# Patient Record
Sex: FEMALE
[De-identification: names, ages, dates, MRNs, and addresses within clinical notes are randomized per-mention and may not be internally consistent; named-entity substitution may affect disease eponyms.]

---

## 2017-05-24 ENCOUNTER — HOSPITAL (OUTPATIENT)
Dept: OTHER | Age: 37
End: 2017-05-24
Attending: FAMILY MEDICINE

## 2017-06-20 ENCOUNTER — HOSPITAL (OUTPATIENT)
Dept: OTHER | Age: 37
End: 2017-06-20
Attending: FAMILY MEDICINE

## 2018-07-15 ENCOUNTER — LAB SERVICES (OUTPATIENT)
Dept: OTHER | Age: 38
End: 2018-07-15

## 2018-07-15 ENCOUNTER — CHARTING TRANS (OUTPATIENT)
Dept: OTHER | Age: 38
End: 2018-07-15

## 2018-07-15 LAB — RAPID STREP GROUP A: NORMAL

## 2018-10-31 VITALS
HEART RATE: 84 BPM | DIASTOLIC BLOOD PRESSURE: 80 MMHG | SYSTOLIC BLOOD PRESSURE: 104 MMHG | WEIGHT: 132.28 LBS | BODY MASS INDEX: 24.97 KG/M2 | RESPIRATION RATE: 16 BRPM | TEMPERATURE: 98.3 F | HEIGHT: 61 IN

## 2022-01-25 ENCOUNTER — LAB ENCOUNTER (OUTPATIENT)
Dept: LAB | Age: 42
End: 2022-01-25
Attending: PHYSICIAN ASSISTANT
Payer: COMMERCIAL

## 2022-01-25 DIAGNOSIS — Z12.31 ENCOUNTER FOR SCREENING MAMMOGRAM FOR BREAST CANCER: Primary | ICD-10-CM

## 2022-01-25 LAB
ALBUMIN SERPL-MCNC: 4 G/DL (ref 3.4–5)
ALBUMIN/GLOB SERPL: 1.3 {RATIO} (ref 1–2)
ALP LIVER SERPL-CCNC: 81 U/L
ALT SERPL-CCNC: 28 U/L
ANION GAP SERPL CALC-SCNC: 6 MMOL/L (ref 0–18)
AST SERPL-CCNC: 12 U/L (ref 15–37)
BASOPHILS # BLD AUTO: 0.06 X10(3) UL (ref 0–0.2)
BASOPHILS NFR BLD AUTO: 0.7 %
BILIRUB SERPL-MCNC: 0.3 MG/DL (ref 0.1–2)
BILIRUB UR QL STRIP.AUTO: NEGATIVE
BUN BLD-MCNC: 11 MG/DL (ref 7–18)
CALCIUM BLD-MCNC: 9.5 MG/DL (ref 8.5–10.1)
CHLORIDE SERPL-SCNC: 106 MMOL/L (ref 98–112)
CHOLEST SERPL-MCNC: 224 MG/DL (ref ?–200)
CLARITY UR REFRACT.AUTO: CLEAR
CO2 SERPL-SCNC: 27 MMOL/L (ref 21–32)
COLOR UR AUTO: YELLOW
CREAT BLD-MCNC: 0.79 MG/DL
EOSINOPHIL # BLD AUTO: 0.21 X10(3) UL (ref 0–0.7)
EOSINOPHIL NFR BLD AUTO: 2.6 %
ERYTHROCYTE [DISTWIDTH] IN BLOOD BY AUTOMATED COUNT: 15.8 %
FASTING PATIENT LIPID ANSWER: YES
FASTING STATUS PATIENT QL REPORTED: YES
GLOBULIN PLAS-MCNC: 3.2 G/DL (ref 2.8–4.4)
GLUCOSE BLD-MCNC: 82 MG/DL (ref 70–99)
GLUCOSE UR STRIP.AUTO-MCNC: NEGATIVE MG/DL
HCT VFR BLD AUTO: 38.9 %
HDLC SERPL-MCNC: 49 MG/DL (ref 40–59)
HGB BLD-MCNC: 11.3 G/DL
IMM GRANULOCYTES # BLD AUTO: 0.02 X10(3) UL (ref 0–1)
IMM GRANULOCYTES NFR BLD: 0.2 %
KETONES UR STRIP.AUTO-MCNC: NEGATIVE MG/DL
LDLC SERPL CALC-MCNC: 159 MG/DL (ref ?–100)
LEUKOCYTE ESTERASE UR QL STRIP.AUTO: NEGATIVE
LYMPHOCYTES # BLD AUTO: 3.5 X10(3) UL (ref 1–4)
LYMPHOCYTES NFR BLD AUTO: 43.1 %
MCH RBC QN AUTO: 24 PG (ref 26–34)
MCHC RBC AUTO-ENTMCNC: 29 G/DL (ref 31–37)
MCV RBC AUTO: 82.8 FL
MONOCYTES # BLD AUTO: 0.59 X10(3) UL (ref 0.1–1)
MONOCYTES NFR BLD AUTO: 7.3 %
NEUTROPHILS # BLD AUTO: 3.75 X10 (3) UL (ref 1.5–7.7)
NEUTROPHILS # BLD AUTO: 3.75 X10(3) UL (ref 1.5–7.7)
NEUTROPHILS NFR BLD AUTO: 46.1 %
NITRITE UR QL STRIP.AUTO: NEGATIVE
NONHDLC SERPL-MCNC: 175 MG/DL (ref ?–130)
OSMOLALITY SERPL CALC.SUM OF ELEC: 286 MOSM/KG (ref 275–295)
PH UR STRIP.AUTO: 5 [PH] (ref 5–8)
PLATELET # BLD AUTO: 374 10(3)UL (ref 150–450)
POTASSIUM SERPL-SCNC: 4 MMOL/L (ref 3.5–5.1)
PROT SERPL-MCNC: 7.2 G/DL (ref 6.4–8.2)
PROT UR STRIP.AUTO-MCNC: NEGATIVE MG/DL
RBC # BLD AUTO: 4.7 X10(6)UL
SODIUM SERPL-SCNC: 139 MMOL/L (ref 136–145)
SP GR UR STRIP.AUTO: 1.02 (ref 1–1.03)
TRIGL SERPL-MCNC: 88 MG/DL (ref 30–149)
TSI SER-ACNC: 2.01 MIU/ML (ref 0.36–3.74)
UROBILINOGEN UR STRIP.AUTO-MCNC: <2 MG/DL
VLDLC SERPL CALC-MCNC: 17 MG/DL (ref 0–30)
WBC # BLD AUTO: 8.1 X10(3) UL (ref 4–11)

## 2022-01-25 PROCEDURE — 84443 ASSAY THYROID STIM HORMONE: CPT

## 2022-01-25 PROCEDURE — 36415 COLL VENOUS BLD VENIPUNCTURE: CPT

## 2022-01-25 PROCEDURE — 81001 URINALYSIS AUTO W/SCOPE: CPT

## 2022-01-25 PROCEDURE — 80061 LIPID PANEL: CPT

## 2022-01-25 PROCEDURE — 80053 COMPREHEN METABOLIC PANEL: CPT

## 2022-01-25 PROCEDURE — 85025 COMPLETE CBC W/AUTO DIFF WBC: CPT

## 2022-02-17 ENCOUNTER — HOSPITAL ENCOUNTER (OUTPATIENT)
Dept: MAMMOGRAPHY | Age: 42
Discharge: HOME | End: 2022-02-17
Attending: PHYSICIAN ASSISTANT
Payer: COMMERCIAL

## 2022-02-17 DIAGNOSIS — Z12.31 ENCOUNTER FOR SCREENING MAMMOGRAM FOR MALIGNANT NEOPLASM OF BREAST: ICD-10-CM

## 2022-02-17 PROCEDURE — 77063 BREAST TOMOSYNTHESIS BI: CPT | Performed by: PHYSICIAN ASSISTANT

## 2022-02-17 PROCEDURE — 77067 SCR MAMMO BI INCL CAD: CPT | Performed by: PHYSICIAN ASSISTANT

## 2022-02-22 ENCOUNTER — HOSPITAL ENCOUNTER (OUTPATIENT)
Dept: ULTRASOUND IMAGING | Age: 42
Discharge: HOME | End: 2022-02-22
Attending: PHYSICIAN ASSISTANT
Payer: COMMERCIAL

## 2022-02-22 DIAGNOSIS — N92.1 METRORRHAGIA: ICD-10-CM

## 2022-02-22 PROCEDURE — 76830 TRANSVAGINAL US NON-OB: CPT | Performed by: PHYSICIAN ASSISTANT

## 2022-02-22 PROCEDURE — 76856 US EXAM PELVIC COMPLETE: CPT | Performed by: PHYSICIAN ASSISTANT

## 2022-03-16 PROBLEM — K21.9 GASTROESOPHAGEAL REFLUX DISEASE: Status: ACTIVE | Noted: 2022-03-16

## 2022-03-16 PROBLEM — K58.9 IRRITABLE BOWEL SYNDROME: Status: ACTIVE | Noted: 2022-03-16

## 2022-04-07 ENCOUNTER — HOSPITAL ENCOUNTER (OUTPATIENT)
Dept: CT IMAGING | Age: 42
Discharge: HOME | End: 2022-04-07
Attending: PHYSICIAN ASSISTANT
Payer: COMMERCIAL

## 2022-04-07 ENCOUNTER — HOSPITAL ENCOUNTER (OUTPATIENT)
Dept: ULTRASOUND IMAGING | Age: 42
Discharge: HOME | End: 2022-04-07
Attending: PHYSICIAN ASSISTANT
Payer: COMMERCIAL

## 2022-04-07 DIAGNOSIS — R10.30 LOWER ABDOMINAL PAIN: ICD-10-CM

## 2022-04-07 DIAGNOSIS — E04.9 GOITER: ICD-10-CM

## 2022-04-07 LAB — CREAT BLD-MCNC: 0.8 MG/DL

## 2022-04-07 PROCEDURE — 76536 US EXAM OF HEAD AND NECK: CPT | Performed by: PHYSICIAN ASSISTANT

## 2022-04-07 PROCEDURE — 82565 ASSAY OF CREATININE: CPT

## 2022-04-07 PROCEDURE — 74178 CT ABD&PLV WO CNTR FLWD CNTR: CPT | Performed by: PHYSICIAN ASSISTANT

## 2022-04-07 RX ORDER — IOHEXOL 350 MG/ML
80 INJECTION, SOLUTION INTRAVENOUS
Status: COMPLETED | OUTPATIENT
Start: 2022-04-07 | End: 2022-04-07

## 2022-04-07 RX ADMIN — IOHEXOL 80 ML: 350 INJECTION, SOLUTION INTRAVENOUS at 15:23:00

## 2022-04-14 ENCOUNTER — LAB ENCOUNTER (OUTPATIENT)
Dept: LAB | Age: 42
End: 2022-04-14
Attending: PHYSICIAN ASSISTANT
Payer: COMMERCIAL

## 2022-04-14 DIAGNOSIS — E04.9 GOITER: Primary | ICD-10-CM

## 2022-04-14 LAB
IGA SERPL-MCNC: 108 MG/DL (ref 70–312)
T3FREE SERPL-MCNC: 2.48 PG/ML (ref 2.4–4.2)
T4 FREE SERPL-MCNC: 1.1 NG/DL (ref 0.8–1.7)
TSI SER-ACNC: 2.65 MIU/ML (ref 0.36–3.74)

## 2022-04-14 PROCEDURE — 84443 ASSAY THYROID STIM HORMONE: CPT

## 2022-04-14 PROCEDURE — 86364 TISS TRNSGLTMNASE EA IG CLAS: CPT

## 2022-04-14 PROCEDURE — 36415 COLL VENOUS BLD VENIPUNCTURE: CPT

## 2022-04-14 PROCEDURE — 84481 FREE ASSAY (FT-3): CPT

## 2022-04-14 PROCEDURE — 84439 ASSAY OF FREE THYROXINE: CPT

## 2022-04-15 LAB — TTG IGA SER-ACNC: 0.3 U/ML (ref ?–7)

## 2022-05-08 ENCOUNTER — LAB ENCOUNTER (OUTPATIENT)
Dept: LAB | Facility: HOSPITAL | Age: 42
End: 2022-05-08
Payer: COMMERCIAL

## 2022-05-08 DIAGNOSIS — Z01.818 PRE-OP TESTING: ICD-10-CM

## 2022-05-09 LAB — SARS-COV-2 RNA RESP QL NAA+PROBE: NOT DETECTED

## 2022-05-11 PROBLEM — R10.84 ABDOMINAL PAIN, GENERALIZED: Status: ACTIVE | Noted: 2022-05-11

## 2022-05-11 PROBLEM — K92.1 HEMATOCHEZIA: Status: ACTIVE | Noted: 2022-05-11

## 2022-05-11 PROBLEM — R10.13 ABDOMINAL PAIN, EPIGASTRIC: Status: ACTIVE | Noted: 2022-05-11

## 2022-05-11 PROBLEM — R63.4 WEIGHT LOSS: Status: ACTIVE | Noted: 2022-05-11

## 2022-05-11 PROCEDURE — 88305 TISSUE EXAM BY PATHOLOGIST: CPT | Performed by: INTERNAL MEDICINE

## 2023-11-17 ENCOUNTER — HOSPITAL ENCOUNTER (EMERGENCY)
Facility: HOSPITAL | Age: 43
Discharge: HOME | End: 2023-11-17
Attending: EMERGENCY MEDICINE
Payer: COMMERCIAL

## 2023-11-17 ENCOUNTER — APPOINTMENT (OUTPATIENT)
Dept: MRI IMAGING | Facility: HOSPITAL | Age: 43
End: 2023-11-17
Attending: EMERGENCY MEDICINE
Payer: COMMERCIAL

## 2023-11-17 VITALS
BODY MASS INDEX: 25.49 KG/M2 | RESPIRATION RATE: 16 BRPM | HEIGHT: 61 IN | SYSTOLIC BLOOD PRESSURE: 125 MMHG | OXYGEN SATURATION: 97 % | TEMPERATURE: 97 F | DIASTOLIC BLOOD PRESSURE: 63 MMHG | WEIGHT: 135 LBS | HEART RATE: 83 BPM

## 2023-11-17 DIAGNOSIS — R42 VERTIGO: Primary | ICD-10-CM

## 2023-11-17 LAB
ALBUMIN SERPL-MCNC: 3.8 G/DL (ref 3.4–5)
ALBUMIN/GLOB SERPL: 1 {RATIO} (ref 1–2)
ALP LIVER SERPL-CCNC: 99 U/L
ALT SERPL-CCNC: 23 U/L
ANION GAP SERPL CALC-SCNC: 3 MMOL/L (ref 0–18)
AST SERPL-CCNC: 17 U/L (ref 15–37)
BASOPHILS # BLD AUTO: 0.07 X10(3) UL (ref 0–0.2)
BASOPHILS NFR BLD AUTO: 0.8 %
BILIRUB SERPL-MCNC: 0.3 MG/DL (ref 0.1–2)
BUN BLD-MCNC: 9 MG/DL (ref 9–23)
CALCIUM BLD-MCNC: 9.9 MG/DL (ref 8.5–10.1)
CHLORIDE SERPL-SCNC: 105 MMOL/L (ref 98–112)
CO2 SERPL-SCNC: 29 MMOL/L (ref 21–32)
CREAT BLD-MCNC: 0.9 MG/DL
EGFRCR SERPLBLD CKD-EPI 2021: 81 ML/MIN/1.73M2 (ref 60–?)
EOSINOPHIL # BLD AUTO: 0.24 X10(3) UL (ref 0–0.7)
EOSINOPHIL NFR BLD AUTO: 2.6 %
ERYTHROCYTE [DISTWIDTH] IN BLOOD BY AUTOMATED COUNT: 13.2 %
GLOBULIN PLAS-MCNC: 3.9 G/DL (ref 2.8–4.4)
GLUCOSE BLD-MCNC: 118 MG/DL (ref 70–99)
HCT VFR BLD AUTO: 40.5 %
HGB BLD-MCNC: 13.2 G/DL
IMM GRANULOCYTES # BLD AUTO: 0.03 X10(3) UL (ref 0–1)
IMM GRANULOCYTES NFR BLD: 0.3 %
LIPASE SERPL-CCNC: 46 U/L (ref 13–75)
LYMPHOCYTES # BLD AUTO: 2.97 X10(3) UL (ref 1–4)
LYMPHOCYTES NFR BLD AUTO: 32 %
MCH RBC QN AUTO: 27.3 PG (ref 26–34)
MCHC RBC AUTO-ENTMCNC: 32.6 G/DL (ref 31–37)
MCV RBC AUTO: 83.9 FL
MONOCYTES # BLD AUTO: 0.53 X10(3) UL (ref 0.1–1)
MONOCYTES NFR BLD AUTO: 5.7 %
NEUTROPHILS # BLD AUTO: 5.43 X10 (3) UL (ref 1.5–7.7)
NEUTROPHILS # BLD AUTO: 5.43 X10(3) UL (ref 1.5–7.7)
NEUTROPHILS NFR BLD AUTO: 58.6 %
OSMOLALITY SERPL CALC.SUM OF ELEC: 284 MOSM/KG (ref 275–295)
PLATELET # BLD AUTO: 348 10(3)UL (ref 150–450)
POTASSIUM SERPL-SCNC: 3.2 MMOL/L (ref 3.5–5.1)
PROT SERPL-MCNC: 7.7 G/DL (ref 6.4–8.2)
RBC # BLD AUTO: 4.83 X10(6)UL
SODIUM SERPL-SCNC: 137 MMOL/L (ref 136–145)
WBC # BLD AUTO: 9.3 X10(3) UL (ref 4–11)

## 2023-11-17 PROCEDURE — 99285 EMERGENCY DEPT VISIT HI MDM: CPT

## 2023-11-17 PROCEDURE — 93010 ELECTROCARDIOGRAM REPORT: CPT

## 2023-11-17 PROCEDURE — 83690 ASSAY OF LIPASE: CPT

## 2023-11-17 PROCEDURE — 85025 COMPLETE CBC W/AUTO DIFF WBC: CPT

## 2023-11-17 PROCEDURE — 70546 MR ANGIOGRAPH HEAD W/O&W/DYE: CPT | Performed by: EMERGENCY MEDICINE

## 2023-11-17 PROCEDURE — 70553 MRI BRAIN STEM W/O & W/DYE: CPT | Performed by: EMERGENCY MEDICINE

## 2023-11-17 PROCEDURE — 96375 TX/PRO/DX INJ NEW DRUG ADDON: CPT

## 2023-11-17 PROCEDURE — 80053 COMPREHEN METABOLIC PANEL: CPT | Performed by: EMERGENCY MEDICINE

## 2023-11-17 PROCEDURE — 96374 THER/PROPH/DIAG INJ IV PUSH: CPT

## 2023-11-17 PROCEDURE — 70549 MR ANGIOGRAPH NECK W/O&W/DYE: CPT | Performed by: EMERGENCY MEDICINE

## 2023-11-17 PROCEDURE — A9575 INJ GADOTERATE MEGLUMI 0.1ML: HCPCS | Performed by: EMERGENCY MEDICINE

## 2023-11-17 PROCEDURE — 93005 ELECTROCARDIOGRAM TRACING: CPT

## 2023-11-17 PROCEDURE — 99284 EMERGENCY DEPT VISIT MOD MDM: CPT

## 2023-11-17 PROCEDURE — 85025 COMPLETE CBC W/AUTO DIFF WBC: CPT | Performed by: EMERGENCY MEDICINE

## 2023-11-17 PROCEDURE — 80053 COMPREHEN METABOLIC PANEL: CPT

## 2023-11-17 PROCEDURE — 83690 ASSAY OF LIPASE: CPT | Performed by: EMERGENCY MEDICINE

## 2023-11-17 RX ORDER — DIAZEPAM 5 MG/1
5 TABLET ORAL 3 TIMES DAILY PRN
Qty: 15 TABLET | Refills: 0 | Status: SHIPPED | OUTPATIENT
Start: 2023-11-17 | End: 2023-11-22

## 2023-11-17 RX ORDER — GADOTERATE MEGLUMINE 376.9 MG/ML
15 INJECTION INTRAVENOUS
Status: COMPLETED | OUTPATIENT
Start: 2023-11-17 | End: 2023-11-17

## 2023-11-17 RX ORDER — LORAZEPAM 2 MG/ML
0.5 INJECTION INTRAMUSCULAR ONCE
Status: COMPLETED | OUTPATIENT
Start: 2023-11-17 | End: 2023-11-17

## 2023-11-17 RX ORDER — ONDANSETRON 4 MG/1
4 TABLET, ORALLY DISINTEGRATING ORAL EVERY 8 HOURS PRN
Qty: 20 TABLET | Refills: 0 | Status: SHIPPED | OUTPATIENT
Start: 2023-11-17 | End: 2023-11-24

## 2023-11-17 RX ORDER — POTASSIUM CHLORIDE 20 MEQ/1
40 TABLET, EXTENDED RELEASE ORAL ONCE
Status: COMPLETED | OUTPATIENT
Start: 2023-11-17 | End: 2023-11-17

## 2023-11-17 RX ORDER — SODIUM CHLORIDE 9 MG/ML
1000 INJECTION, SOLUTION INTRAVENOUS CONTINUOUS
Status: DISCONTINUED | OUTPATIENT
Start: 2023-11-17 | End: 2023-11-17

## 2023-11-17 RX ORDER — MECLIZINE HYDROCHLORIDE 25 MG/1
25 TABLET ORAL 3 TIMES DAILY PRN
Qty: 20 TABLET | Refills: 0 | Status: SHIPPED | OUTPATIENT
Start: 2023-11-17

## 2023-11-17 RX ORDER — ONDANSETRON 2 MG/ML
4 INJECTION INTRAMUSCULAR; INTRAVENOUS ONCE
Status: COMPLETED | OUTPATIENT
Start: 2023-11-17 | End: 2023-11-17

## 2023-11-17 RX ORDER — MECLIZINE HYDROCHLORIDE 25 MG/1
25 TABLET ORAL ONCE
Status: COMPLETED | OUTPATIENT
Start: 2023-11-17 | End: 2023-11-17

## 2023-11-17 NOTE — ED INITIAL ASSESSMENT (HPI)
A&Ox3 patient p/w dizziness and nausea xmonths    Patient reports having constant symptoms since having hysterectomy surgery at [facility] >6mo ago    Reports worsening dizziness today \"the room is spinning and I am spinning, and I feel like I will faint\"    Denies any cp/sob/v/d/c/f/LH/vision changes/urinary sx at this time    RR even/NL, speaking in full clear sentences

## 2023-11-18 LAB
ATRIAL RATE: 100 BPM
P AXIS: 57 DEGREES
P-R INTERVAL: 164 MS
Q-T INTERVAL: 352 MS
QRS DURATION: 82 MS
QTC CALCULATION (BEZET): 454 MS
R AXIS: -9 DEGREES
T AXIS: 53 DEGREES
VENTRICULAR RATE: 100 BPM

## 2023-11-18 NOTE — DISCHARGE INSTRUCTIONS
Return to emergency room if further problems    Take Antivert for mild vertigo    Valium for severe vertigo    Zofran for nausea/vomiting    Recommend Epley maneuvers 3 times daily at home

## 2025-06-27 ENCOUNTER — HOSPITAL ENCOUNTER (OUTPATIENT)
Facility: HOSPITAL | Age: 45
Setting detail: HOSPITAL OUTPATIENT SURGERY
Discharge: HOME | End: 2025-06-27
Attending: STUDENT IN AN ORGANIZED HEALTH CARE EDUCATION/TRAINING PROGRAM | Admitting: STUDENT IN AN ORGANIZED HEALTH CARE EDUCATION/TRAINING PROGRAM
Payer: COMMERCIAL

## 2025-06-27 ENCOUNTER — ANESTHESIA EVENT (OUTPATIENT)
Dept: SURGERY | Facility: HOSPITAL | Age: 45
End: 2025-06-27
Payer: COMMERCIAL

## 2025-06-27 ENCOUNTER — ANESTHESIA (OUTPATIENT)
Dept: SURGERY | Facility: HOSPITAL | Age: 45
End: 2025-06-27
Payer: COMMERCIAL

## 2025-06-27 VITALS
RESPIRATION RATE: 16 BRPM | DIASTOLIC BLOOD PRESSURE: 73 MMHG | WEIGHT: 140.81 LBS | TEMPERATURE: 97 F | HEART RATE: 91 BPM | BODY MASS INDEX: 26.58 KG/M2 | OXYGEN SATURATION: 99 % | HEIGHT: 61 IN | SYSTOLIC BLOOD PRESSURE: 121 MMHG

## 2025-06-27 PROCEDURE — 88305 TISSUE EXAM BY PATHOLOGIST: CPT | Performed by: STUDENT IN AN ORGANIZED HEALTH CARE EDUCATION/TRAINING PROGRAM

## 2025-06-27 PROCEDURE — 88304 TISSUE EXAM BY PATHOLOGIST: CPT | Performed by: STUDENT IN AN ORGANIZED HEALTH CARE EDUCATION/TRAINING PROGRAM

## 2025-06-27 RX ORDER — MIDAZOLAM HYDROCHLORIDE 1 MG/ML
1 INJECTION INTRAMUSCULAR; INTRAVENOUS EVERY 5 MIN PRN
Status: DISCONTINUED | OUTPATIENT
Start: 2025-06-27 | End: 2025-06-27

## 2025-06-27 RX ORDER — DEXAMETHASONE SODIUM PHOSPHATE 4 MG/ML
VIAL (ML) INJECTION AS NEEDED
Status: DISCONTINUED | OUTPATIENT
Start: 2025-06-27 | End: 2025-06-27 | Stop reason: SURG

## 2025-06-27 RX ORDER — HYDROMORPHONE HYDROCHLORIDE 1 MG/ML
0.6 INJECTION, SOLUTION INTRAMUSCULAR; INTRAVENOUS; SUBCUTANEOUS EVERY 5 MIN PRN
Status: DISCONTINUED | OUTPATIENT
Start: 2025-06-27 | End: 2025-06-27

## 2025-06-27 RX ORDER — METOCLOPRAMIDE HYDROCHLORIDE 5 MG/ML
INJECTION INTRAMUSCULAR; INTRAVENOUS AS NEEDED
Status: DISCONTINUED | OUTPATIENT
Start: 2025-06-27 | End: 2025-06-27 | Stop reason: SURG

## 2025-06-27 RX ORDER — NALOXONE HYDROCHLORIDE 0.4 MG/ML
80 INJECTION, SOLUTION INTRAMUSCULAR; INTRAVENOUS; SUBCUTANEOUS AS NEEDED
Status: DISCONTINUED | OUTPATIENT
Start: 2025-06-27 | End: 2025-06-27

## 2025-06-27 RX ORDER — ACETAMINOPHEN 500 MG
1000 TABLET ORAL ONCE
Status: DISCONTINUED | OUTPATIENT
Start: 2025-06-27 | End: 2025-06-27 | Stop reason: HOSPADM

## 2025-06-27 RX ORDER — SODIUM CHLORIDE, SODIUM LACTATE, POTASSIUM CHLORIDE, CALCIUM CHLORIDE 600; 310; 30; 20 MG/100ML; MG/100ML; MG/100ML; MG/100ML
INJECTION, SOLUTION INTRAVENOUS CONTINUOUS
Status: DISCONTINUED | OUTPATIENT
Start: 2025-06-27 | End: 2025-06-27

## 2025-06-27 RX ORDER — HYDROCODONE BITARTRATE AND ACETAMINOPHEN 5; 325 MG/1; MG/1
1 TABLET ORAL ONCE AS NEEDED
Status: DISCONTINUED | OUTPATIENT
Start: 2025-06-27 | End: 2025-06-27

## 2025-06-27 RX ORDER — HYDROCODONE BITARTRATE AND ACETAMINOPHEN 5; 325 MG/1; MG/1
2 TABLET ORAL ONCE AS NEEDED
Status: DISCONTINUED | OUTPATIENT
Start: 2025-06-27 | End: 2025-06-27

## 2025-06-27 RX ORDER — ACETAMINOPHEN 500 MG
1000 TABLET ORAL ONCE AS NEEDED
Status: DISCONTINUED | OUTPATIENT
Start: 2025-06-27 | End: 2025-06-27

## 2025-06-27 RX ORDER — ONDANSETRON 2 MG/ML
4 INJECTION INTRAMUSCULAR; INTRAVENOUS EVERY 6 HOURS PRN
Status: DISCONTINUED | OUTPATIENT
Start: 2025-06-27 | End: 2025-06-27

## 2025-06-27 RX ORDER — PROCHLORPERAZINE EDISYLATE 5 MG/ML
5 INJECTION INTRAMUSCULAR; INTRAVENOUS EVERY 8 HOURS PRN
Status: DISCONTINUED | OUTPATIENT
Start: 2025-06-27 | End: 2025-06-27

## 2025-06-27 RX ORDER — MEPERIDINE HYDROCHLORIDE 25 MG/ML
12.5 INJECTION INTRAMUSCULAR; INTRAVENOUS; SUBCUTANEOUS AS NEEDED
Status: DISCONTINUED | OUTPATIENT
Start: 2025-06-27 | End: 2025-06-27

## 2025-06-27 RX ORDER — HYDROMORPHONE HYDROCHLORIDE 1 MG/ML
0.4 INJECTION, SOLUTION INTRAMUSCULAR; INTRAVENOUS; SUBCUTANEOUS EVERY 5 MIN PRN
Status: DISCONTINUED | OUTPATIENT
Start: 2025-06-27 | End: 2025-06-27

## 2025-06-27 RX ORDER — HYDROMORPHONE HYDROCHLORIDE 1 MG/ML
0.2 INJECTION, SOLUTION INTRAMUSCULAR; INTRAVENOUS; SUBCUTANEOUS EVERY 5 MIN PRN
Status: DISCONTINUED | OUTPATIENT
Start: 2025-06-27 | End: 2025-06-27

## 2025-06-27 RX ORDER — ONDANSETRON 2 MG/ML
INJECTION INTRAMUSCULAR; INTRAVENOUS AS NEEDED
Status: DISCONTINUED | OUTPATIENT
Start: 2025-06-27 | End: 2025-06-27 | Stop reason: SURG

## 2025-06-27 RX ORDER — LIDOCAINE HYDROCHLORIDE AND EPINEPHRINE 10; 10 MG/ML; UG/ML
INJECTION, SOLUTION INFILTRATION; PERINEURAL AS NEEDED
Status: DISCONTINUED | OUTPATIENT
Start: 2025-06-27 | End: 2025-06-27 | Stop reason: HOSPADM

## 2025-06-27 RX ORDER — PHENYLEPHRINE HCL 10 MG/ML
VIAL (ML) INJECTION AS NEEDED
Status: DISCONTINUED | OUTPATIENT
Start: 2025-06-27 | End: 2025-06-27 | Stop reason: SURG

## 2025-06-27 RX ORDER — SCOPOLAMINE 1 MG/3D
1 PATCH, EXTENDED RELEASE TRANSDERMAL ONCE
Status: DISCONTINUED | OUTPATIENT
Start: 2025-06-27 | End: 2025-06-27 | Stop reason: HOSPADM

## 2025-06-27 RX ADMIN — PHENYLEPHRINE HCL 100 MCG: 10 MG/ML VIAL (ML) INJECTION at 10:05:00

## 2025-06-27 RX ADMIN — METOCLOPRAMIDE HYDROCHLORIDE 10 MG: 5 INJECTION INTRAMUSCULAR; INTRAVENOUS at 09:53:00

## 2025-06-27 RX ADMIN — SODIUM CHLORIDE, SODIUM LACTATE, POTASSIUM CHLORIDE, CALCIUM CHLORIDE: 600; 310; 30; 20 INJECTION, SOLUTION INTRAVENOUS at 11:24:00

## 2025-06-27 RX ADMIN — DEXAMETHASONE SODIUM PHOSPHATE 4 MG: 4 MG/ML VIAL (ML) INJECTION at 09:53:00

## 2025-06-27 RX ADMIN — ONDANSETRON 4 MG: 2 INJECTION INTRAMUSCULAR; INTRAVENOUS at 09:53:00

## 2025-06-27 NOTE — DISCHARGE INSTRUCTIONS
[facility]  ENT/Facial Plastic and Reconstructive Surgery  [address]   Phone [phone]    Pain control  The patient may experience mild pain after surgery. For most patients, this pain is manageable by alternating Tylenol with ibuprofen (Motrin, Advil) every 6 hours. We recommend first giving Tylenol, wait 3 hours, then give Motrin, wait 3 hours again, then give Tylenol, and so on. The Tylenol and ibuprofen are both over-the-counter medications. Follow the instructions on the bottles for dosing.     You may also be given a prescription for a narcotic medication (for example, oxycodone or an equivalent medication). You may use the narcotic medication every 4 hours if the pain is not adequately relieved by the other two medications. Please note that the narcotic is optional and should only be used if pain is not properly relieved by Tylenol and Motrin.     Side effects of narcotics include sedation, nausea, stomach upset, and constipation.  Avoid operating heavy machinery, driving, or taking other sedating medications while taking narcotics. Eat a high fiber diet and use stool softeners as needed.    You SHOULD NOT take Aspirin or any product containing aspirin for 14 days after surgery without consulting with our office, as they may increase the risk of bleeding.    Incision care  If a dressing is placed by your surgeon, you may remove your dressing 24 hours after surgery. If the dressing falls off before then, please contact the office to ask for further directives.  You may ice your face/neck/areas near your incision lines right away after surgery, unless you are told otherwise. Icing may help reduce swelling and discomfort.  If possible, sleep with head elevated for the first 48 hours to minimize swelling.  When can you shower?  You may begin showering AFTER you remove the surgical dressing  Do not let water hit the incision directly while you are still healing  After you  begin showering, you may cleanse the incision site/suture line with 1/2 strength hydrogen peroxide (i.e. equal parts water to hydrogen peroxide) on a Q-tip, using in a rolling motion to remove any crusting. This will promote healing and minimize the final scar line.  After 3 days of cleaning with hydrogen peroxide, clean area with mild soap (for example, Dove soap or Cetaphil)   Inspect daily for signs of infection: yellow thick discharge, increased localized redness, and temperature greater than 101.0 F.    Ointment use  If a dressing is placed by your surgeon, you may remove your dressing 24 hours after surgery. After the dressing is removed, you may start applying ointment.  Your surgeon typically put some ointment on the incision at the end of the surgery. You may see this when you remove the dressing.  For the FIRST 3 DAYS: apply a small amount of bacitracin ointment using Q-tips, directly to the incision. Do this 3 times a day. This ointment contains an anti-microbial medication.  After the first 3 days, stop using the bacitracin ointment. Switch to Aquaphor ointment or Vaseline ointment. Apply the Aquaphor or Vaseline ointment directly to the incision, 3 times a day. Do this until your doctor advises you to stop. The Aquaphor or Vaseline ointment keeps the incision moist, which promotes healing. Aquaphor and Vaseline are equally good--you may use either one.   All of these ointments can be purchased over-the-counter and do not require prescriptions.    Eating and drinking  The patient should begin taking fluids right away. If a breathing tube is used during surgery, you may experience a temporary sore throat. This will improve on its own. Sometimes, a prescription throat spray may \"take the edge off\" of the throat pain. Advance diet as tolerated.     Nausea and Vomiting  You may experience some nausea and vomiting which should improve over the first day. Please call your doctor if the vomiting or nausea  persists.     Bleeding  A small amount of bleeding is common during recovery. We would expect that to last 1-2 days and should subside on its own.   Call if you feel there is more bleeding than expected. If the bleeding is profuse and/or life-threatening, please bring the patient straight to the emergency room right away.     Activity  Usually on the day of surgery, the patient will be quiet and want to lie down. There may be ups and downs with the patient feeling well one day and the next day seem to be suffering a set-back. This is normal.     Please limit vigorous physical activity for 14 days. Walking is encouraged.    LONG TERM INCISION CARE    Instructions on scar cream (if applicable)  Talk to your surgeon about whether a scar cream is necessary after surgery. If that is the case, we recommend the BioCorneum scar products, which you can purchase online. Scar screams should not be used until the incisions are fully healed.    After selecting the appropriate product(s), gently massage the wounds for 5 minutes, 5 times a day using the following formula. In the morning, use the silicone scar cream to massage and before bed-time, use the scar cream to massage one more time. For the remaining other 3 massages, use Vitamin E tablets (open the tablet and use the oil inside). If you prefer you may also purchase Vitamin E oil rather than the capsules, but this is generally more expensive.    Sun protection  Avoid exposing scars to sun for at least 6 months.  Always use a strong sunblock, if sun exposure is unavoidable (SPF 30 or greater). Sun block should not be used until the incisions are fully healed. Talk to your doctor about this.    Follow up  Make an appointment to see your surgeon 1 week after surgery.    Questions  If you have any questions or concerns regarding the surgery, please contact the clinic.

## 2025-06-27 NOTE — OPERATIVE REPORT
Preoperative Dx: Right preauricular cyst    Postoperative Dx: Same    Operations performed:  1. Excision of right preauricular cyst (38980)  2. Intermediate wound closure (CPT 54905)    Surgeon: Farrukh Flor MD    Location: [place]    Anesthesia:  Gen    Local infiltration via 1% lidocaine with 1:100,000 epinephrine     Complications: None    Dressing: Bacitracin, Telfa, cotton balls, and tape    Drains: None    Specimen/Pathology: Right preauricular cyst     EBL: 5 cc    Disposition: The patient was discharged in stable condition to home.    Intraoperative findings: The lesion measures 1.8 cm x 1.0 cm and is located in the right preauricular area. She also has a small pit at the root of helix, which is excised as well.    Indication for Procedure: Margaux العراقي is a 45 year old female patient presenting with a lesion in the right preauricular area. The details, limitations, ramifications, complications of the above-stated procedures were reviewed with the patient. The patient elected to proceed with the procedure. Surgical consent is obtained.     Operative Procedure: After informed consent was obtained, the patient was brought from the preoperative holding area to the operating room and placed supine on the operating table. Anesthesia was induced. The planned incisions were marked appropriately and infiltrated with local anesthetics. The patient was then sterilely prepped and draped in the standard surgical fashion.      The skin incision was designed in such a way that it incorporated both her preauricular pit at the root of helix and some unhealthy skin overlying the preauricular cyst. A skin incision was made via a 15-blade in an accommodating skin crease. The lesion was identified and grasped with tooth forceps. It was carefully dissected with tenotomy scissors from the underlying attachments and removed thereafter, taking care to preserve the capsule of the lesion. At the base of the cystic lesion where it  abut the helical cartilage, a small piece of cartilage was shaved with the 15 blade. The specimen was then sent for permanent pathology. The wound was further explored with no evidence of remaining lesion or mass. Appropriate hemostasis was obtained. The defect was closed in layers with 5-0 Monocryl for dermal closure. The skin was closed with 6-0 Prolene suture in a running, everting fashion. Topical bacitracin ointment and dressing was applied. The patient tolerated the procedure well.     The patient was allowed to emerge from anesthesia and was transported in stable condition to the recovery room.     Dictated by: Farrukh Flor MD

## 2025-06-27 NOTE — ANESTHESIA POSTPROCEDURE EVALUATION
[facility]    Margaux العراقي Patient Status:  Hospital Outpatient Surgery   Age/Gender 45 year old female MRN [id number]   Location [facility] POST ANESTHESIA CARE UNIT Attending Farrukh Flor MD   [facility] Day # 0 PCP Danna Sellers MD       Anesthesia Post-op Note    EXCISION OF RIGHT PREAURICULAR CYST AND INTERMEDIATE WOUND REPAIR    Procedure Summary       Date: 06/27/25 Room / Location:  MAIN OR [address] [place] [address] MAIN OR    Anesthesia Start: 0933 Anesthesia Stop: 1124    Procedure: EXCISION OF RIGHT PREAURICULAR CYST AND INTERMEDIATE WOUND REPAIR (Right: Ear) Diagnosis: (ABSCESS OF PREAURICULAR SINUS; PREAURICULAR CYST)    Surgeons: Farrukh Flor MD Anesthesiologist: Vikram Krishna MD    Anesthesia Type: general ASA Status: 2            Anesthesia Type: general    Vitals Value Taken Time   /57 06/27/25 11:43   Temp 97.1 °F (36.2 °C) 06/27/25 11:24   Pulse 96 06/27/25 11:48   Resp 11 06/27/25 11:48   SpO2 98 % 06/27/25 11:48   Vitals shown include unfiled device data.        Patient Location: PACU    Anesthesia Type: MAC and general    Airway Patency: patent    Postop Pain Control: adequate    Mental Status: mildly sedated but able to meaningfully participate in the post-anesthesia evaluation    Nausea/Vomiting: none    Cardiopulmonary/Hydration status: stable euvolemic    Complications: no apparent anesthesia related complications    Postop vital signs: stable    Dental Exam: Unchanged from Preop    Patient to be discharged home when criteria met.

## 2025-06-27 NOTE — ANESTHESIA PROCEDURE NOTES
Airway  Date/Time: 6/27/2025 9:51 AM  Reason: elective    Airway not difficult    General Information and Staff   Patient location during procedure: OR  Anesthesiologist: Vikram Krishna MD  Performed: anesthesiologist   Performed by: Vikram Krishna MD  Authorized by: Vikram Krishna MD        Indications and Patient Condition  Indications for airway management: anesthesia  Sedation level: deep      Preoxygenated: yesPatient position: sniffing    Mask difficulty assessment: 1 - vent by mask    Final Airway Details    Final airway type: endotracheal airway    Successful airway: ETT  Cuffed: yes   Successful intubation technique: direct laryngoscopy  Endotracheal tube insertion site: oral  Blade: Marge  Blade size: #3  ETT size (mm): 7.0    Cormack-Lehane Classification: grade IIB - view of arytenoids or posterior of glottis only  Placement verified by: capnometry   Measured from: lips  Number of attempts at approach: 1

## 2025-06-27 NOTE — H&P
[facility]   part of [facility]    Pre-op History & Physical Exam Note    Margaux العراقي Patient Status:  Hospital Outpatient Surgery    4/15/1980 MRN [id number]   Location [facility] PERIOPERATIVE SERVICE Attending Farrukh Flor MD   Hosp Day # 0 PCP Danna Sellers MD     Date: 2025    Date of Procedure:  2025    History of Present Illness: Margaux العراقي is a(n) 45 year old female patient who presents for the scheduled operation(s) today. There have been no interval changes to the patient's health or medications since the last clinic evaluation. The patient has been medically optimized for surgery.      Past Medical History[1]  Past Surgical History[2]  Family History[3]   reports that she quit smoking about 26 years ago. Her smoking use included cigarettes. She has never used smokeless tobacco. She reports current alcohol use. She reports that she does not use drugs.  Allergies:  Allergies[4]  Medications:  Prescriptions Prior to Admission[5]  Review of Systems:  GENERAL: feels well, not in distress  SKIN: denies any unusual skin lesions or rashes  RESPIRATORY: denies shortness of breath with exertion or at rest  CARDIOVASCULAR: denies chest pain on exertion or at rest  GI: denies abdominal pain   : denies any burning with urination, urinary frequency or urgency  NEURO: denies headaches, numbness or tingling, mental status changes  PSYCH: denies depressed mood  MUSC: denies muscle aches, joint pain  The rest of the pertinent ROS is negative.    Physical Exam:  /81 (BP Location: Left arm)   Pulse 96   Temp 97.7 °F (36.5 °C) (Oral)   Resp 16   Ht 5' 1\" (1.549 m)   Wt 140 lb 12.8 oz (63.9 kg)   LMP  (LMP Unknown)   SpO2 100%   BMI 26.60 kg/m²    GENERAL: well developed, well nourished, in no apparent distress  SKIN: no rashes, no suspicious lesions  HEENT: EOMI, MMM  EYES: EOMI, normal conjunctiva, anicteric  LUNGS: normal respiratory effort, clear to auscultation  bilaterally  CARDIO: RRR without murmur, rubs, or gallops  GI: abdomen soft, non-tender, non-distended  EXTREMITIES: no cyanosis, clubbing, or edema  NEURO: A &O x 3, CN intact, motor and sensory are grossly intact    Laboratory Data:   Relevant lab data reviewed.     Imaging:  Relevant imaging reviewed.     Assessment:  Problem List[6]    Plan:  We reviewed the indications, benefits, and risks of the proposed operations, including but not limited to: bleeding, infection, pain, scarring, prolonged wound healing, cosmetic deformity, damage to surrounding structures, persistence or recurrence of the current condition, and need for future surgeries. The patient asked appropriate questions and decided to proceed with surgery.    Plan to proceed with surgery as scheduled.   Surgical marking: Complete  Surgical consent: Obtained    Farrukh Flor MD  6/27/2025  7:54 AM       [1]   Past Medical History:   Abdominal hernia    Abdominal pain    Constant pain this last year but this week severe pain    Anemia    Atypical mole    Back pain    Had epidural for sons birth since then lots of back pain    Bad breath    Bleeding nose    Body piercing    Change in hair    I’m losing  my hair a lot, shedding a lot    Chest pain    Enlarged lymph node    Thyroid glands are enlarged    Fatigue    Since my daughter was born, thought it was a normal thing    Frequent urination    Heavy menses    Since her birth lots of clots slowly bigger now golf ball sz    High cholesterol    Irregular bowel habits    Leaking of urine    Loss of appetite    Menses painful    Since my daughters birth    Night sweats    Pain in joints    PONV (postoperative nausea and vomiting)    Rash    Near my ears and near 1 eyebrow    Sputum production    Stress    With health issues it’s worrying and stressing me    Uncomfortable fullness after meals    [place] in the evening can’t eat dinner    Wears glasses    Weight gain    Weight gain and hard to lose but  recently weight decreased    Weight loss   [2]   Past Surgical History:  Procedure Laterality Date    Colonoscopy  About 6 or 7 years ago    Through you guys so it’ll be in the records    Ct abdomen + pelvis (w+wo) (cpt=74178) N/A 04/07/2022    Bilateral nonobstructing kidney stones. No acute abdominal pelvi process    Hysterectomy      2023   [3]   Family History  Problem Relation Age of Onset    Colon Cancer Paternal Grandfather         Passed when he was in his 80s from colon cancer    Colon Polyps Father    [4] No Known Allergies  [5]   Medications Prior to Admission   Medication Sig Dispense Refill Last Dose/Taking    Na Sulfate-K Sulfate-Mg Sulf (SUPREP BOWEL PREP KIT) 17.5-3.13-1.6 GM/177ML Oral Solution Take as directed by physician. (Patient not taking: Reported on 11/17/2023) 1 each 0 Unknown   [6]   Patient Active Problem List  Diagnosis    Gastroesophageal reflux disease    Irritable bowel syndrome    Abdominal pain, epigastric    Weight loss    Hematochezia    Abdominal pain, generalized

## 2025-06-27 NOTE — ANESTHESIA PREPROCEDURE EVALUATION
PRE-OP EVALUATION    Patient Name: Margaux العراقي    Admit Diagnosis: ABSCESS OF PREAURICULAR SINUS; PREAURICULAR CYST    Pre-op Diagnosis: ABSCESS OF PREAURICULAR SINUS; PREAURICULAR CYST    EXCISION OF RIGHT PREAURICULAR CYST AND INTERMEDIATE WOUND REPAIR    Anesthesia Procedure: EXCISION OF RIGHT PREAURICULAR CYST AND INTERMEDIATE WOUND REPAIR (Right)    Surgeons and Role:     * Farrukh Flor MD - Primary    Pre-op vitals reviewed.  Temp: 97.7 °F (36.5 °C)  Pulse: 96  Resp: 16  BP: 133/81  SpO2: 100 %  Body mass index is 26.6 kg/m².    Current medications reviewed.  Hospital Medications:  Current Medications[1]    Outpatient Medications:   Prescriptions Prior to Admission[2]    Allergies: Patient has no known allergies.      Anesthesia Evaluation    Patient summary reviewed.    Anesthetic Complications  (+) history of anesthetic complications  History of: PONV       GI/Hepatic/Renal      (+) GERD                           Cardiovascular    Negative cardiovascular ROS.                                                   Endo/Other    Negative endo/other ROS.                              Pulmonary    Negative pulmonary ROS.                       Neuro/Psych    Negative neuro/psych ROS.                              Past Surgical History[3]  Social Hx on file[4]  History   Drug Use Unknown     Available pre-op labs reviewed.               Airway      Mallampati: II  Mouth opening: 3 FB  TM distance: 4 - 6 cm  Neck ROM: full Cardiovascular    Cardiovascular exam normal.         Dental  Comment: Normal wear/minor imperfections and discoloration noted. No grossly weakened or damaged teeth on exam.           Pulmonary    Pulmonary exam normal.                 Other findings        ASA: 2   Plan: general  NPO status verified and patient meets guidelines.        Comment: We discussed general anesthesia as the primary form of anesthesia for this procedure. General anesthesia involves the use a breathing tube to help the  patient breathe and deliver anesthetic gasses. Several intravenous medications will be used to help keep the patient safe and comfortable.  Common risks with general anesthesia include nausea, vomiting, scratched eye, sore throat and dental damage. The risk of dental damage is higher for weakened or damaged teeth. Rare but serious risks can occur. Some of these serious complications include brain injury, nerve injury, blindness, and death. Risks of these serious injury are small, but never zero. I asked the patient if they had any questions about the consent form and what was written on it. The patient was given the opportunity to ask questions in the pre op area and in the operating room before going to sleep. All questions were answered.                Present on Admission:  **None**               [1]  • [Transfer Hold] acetaminophen (Tylenol Extra Strength) tab 1,000 mg  1,000 mg Oral Once   • [Transfer Hold] scopolamine (Transderm-Scop) 1 MG/3DAYS patch 1 patch  1 patch Transdermal Once   • lactated ringers infusion   Intravenous Continuous   • ceFAZolin (Ancef) 2g in 10mL IV syringe premix  2 g Intravenous Once   [2]  Medications Prior to Admission   Medication Sig Dispense Refill Last Dose/Taking   • Na Sulfate-K Sulfate-Mg Sulf (SUPREP BOWEL PREP KIT) 17.5-3.13-1.6 GM/177ML Oral Solution Take as directed by physician. (Patient not taking: Reported on 2023) 1 each 0 Unknown   [3]  Past Surgical History:  Procedure Laterality Date   • Colonoscopy  About 6 or 7 years ago    Through you guys so it’ll be in the records   • Ct abdomen + pelvis (w+wo) (cpt=74178) N/A 2022    Bilateral nonobstructing kidney stones. No acute abdominal pelvi process   • Hysterectomy         [4]  Social History  Socioeconomic History   • Marital status:    Tobacco Use   • Smoking status: Former     Current packs/day: 0.00     Types: Cigarettes     Quit date: 1999     Years since quittin.5   • Smokeless  tobacco: Never   Vaping Use   • Vaping status: Never Used   Substance and Sexual Activity   • Alcohol use: Yes     Alcohol/week: 0.0 standard drinks of alcohol     Comment: Occasionally   • Drug use: Never

## (undated) DEVICE — INSULATED BLADE ELECTRODE: Brand: EDGE

## (undated) DEVICE — ANTIBACTERIAL UNDYED BRAIDED (POLYGLACTIN 910), SYNTHETIC ABSORBABLE SUTURE: Brand: COATED VICRYL

## (undated) DEVICE — SUT MCRYL 4-0 18IN PS-2 ABSRB UD 19MM 3/8 CIR

## (undated) DEVICE — GLOVE SUR 7 SENSICARE PI PIP CRM PWD F

## (undated) DEVICE — APPLICATOR STD 6IN COT TIP WOOD HNDL ST

## (undated) DEVICE — SKN PREP SPNG STKS PVP PNT STR: Brand: MEDLINE INDUSTRIES, INC.

## (undated) DEVICE — STERILE COTTON BALLS LARGE 5/P: Brand: MEDLINE

## (undated) DEVICE — CABLE BPLR L12FT FLYING LD DISP

## (undated) DEVICE — DISPOSABLE BIPOLAR FORCEPS 4" (10.2CM) JEWELERS, STRAIGHT 0.4MM TIP AND 12 FT. (3.6M) CABLE: Brand: KIRWAN

## (undated) DEVICE — SLEEVE COMPR MD KNEE LEN SGL USE KENDALL SCD

## (undated) DEVICE — COVER,LIGHT,CAMERA,HARD,1/PK,STRL: Brand: MEDLINE

## (undated) DEVICE — PACK DRAPE HEAD/NECK STER

## (undated) DEVICE — APPLICATOR PREP 10.5ML ORNG CHG 2% ISO ALC

## (undated) DEVICE — SUT COAT VCRL 4-0 27IN RB-1 ABSRB UD 17MM 1/2

## (undated) DEVICE — SOLUTION IRRIG 1000ML 0.9% NACL USP BTL

## (undated) DEVICE — Device

## (undated) DEVICE — 3M™ STERI-STRIP™ REINFORCED ADHESIVE SKIN CLOSURES, R1547, 1/2 IN X 4 IN (12 MM X 100 MM), 6 STRIPS/ENVELOPE: Brand: 3M™ STERI-STRIP™

## (undated) DEVICE — YANKAUER,FLEXIBLE HANDLE,FINE CAPACITY: Brand: MEDLINE

## (undated) DEVICE — PAD,NON-ADHERENT,3X8,STERILE,LF,1/PK: Brand: CURAD